# Patient Record
Sex: MALE | Race: WHITE | NOT HISPANIC OR LATINO | Employment: OTHER | URBAN - NONMETROPOLITAN AREA
[De-identification: names, ages, dates, MRNs, and addresses within clinical notes are randomized per-mention and may not be internally consistent; named-entity substitution may affect disease eponyms.]

---

## 2019-01-22 ENCOUNTER — OFFICE VISIT CONVERTED (OUTPATIENT)
Dept: FAMILY MEDICINE CLINIC | Age: 59
End: 2019-01-22
Attending: FAMILY MEDICINE

## 2019-02-26 ENCOUNTER — OFFICE VISIT CONVERTED (OUTPATIENT)
Dept: FAMILY MEDICINE CLINIC | Age: 59
End: 2019-02-26
Attending: FAMILY MEDICINE

## 2019-05-28 ENCOUNTER — OFFICE VISIT CONVERTED (OUTPATIENT)
Dept: FAMILY MEDICINE CLINIC | Age: 59
End: 2019-05-28
Attending: FAMILY MEDICINE

## 2019-08-26 ENCOUNTER — OFFICE VISIT CONVERTED (OUTPATIENT)
Dept: FAMILY MEDICINE CLINIC | Age: 59
End: 2019-08-26
Attending: FAMILY MEDICINE

## 2020-03-09 ENCOUNTER — OFFICE VISIT CONVERTED (OUTPATIENT)
Dept: FAMILY MEDICINE CLINIC | Age: 60
End: 2020-03-09
Attending: FAMILY MEDICINE

## 2020-11-24 ENCOUNTER — HOSPITAL ENCOUNTER (OUTPATIENT)
Dept: OTHER | Facility: HOSPITAL | Age: 60
Discharge: HOME | End: 2020-11-24
Attending: FAMILY MEDICINE

## 2020-11-24 ENCOUNTER — OFFICE VISIT CONVERTED (OUTPATIENT)
Dept: FAMILY MEDICINE CLINIC | Age: 60
End: 2020-11-24
Attending: FAMILY MEDICINE

## 2020-11-24 LAB
ERYTHROCYTE [DISTWIDTH] IN BLOOD BY AUTOMATED COUNT: 13 % (ref 11.5–14.5)
HBA1C MFR BLD: 15.5 G/DL (ref 14–18)
HCT VFR BLD AUTO: 46.7 % (ref 42–52)
MCH RBC QN AUTO: 27.7 PG (ref 27–31)
MCHC RBC AUTO-ENTMCNC: 33.2 G/DL (ref 33–37)
MCV RBC AUTO: 83.5 FL (ref 80–96)
PLATELET # BLD AUTO: 211 10*3/UL (ref 130–400)
PMV BLD AUTO: 11.4 FL (ref 7.4–10.4)
RBC # BLD AUTO: 5.59 10*6/UL (ref 4.7–6.1)
WBC # BLD AUTO: 7.83 10*3/UL (ref 4.8–10.8)

## 2020-11-25 LAB
ALBUMIN SERPL-MCNC: 4.4 G/DL (ref 3.5–5)
ALBUMIN/GLOB SERPL: 1.8 {RATIO} (ref 1.4–2.6)
ALP SERPL-CCNC: 82 U/L (ref 56–119)
ALT SERPL-CCNC: 32 U/L (ref 10–40)
ANION GAP SERPL CALC-SCNC: 18 MMOL/L (ref 8–19)
AST SERPL-CCNC: 19 U/L (ref 15–50)
BILIRUB SERPL-MCNC: 0.27 MG/DL (ref 0.2–1.3)
BUN SERPL-MCNC: 18 MG/DL (ref 5–25)
BUN/CREAT SERPL: 16 {RATIO} (ref 6–20)
CALCIUM SERPL-MCNC: 9.5 MG/DL (ref 8.7–10.4)
CHLORIDE SERPL-SCNC: 103 MMOL/L (ref 99–111)
CHOLEST SERPL-MCNC: 164 MG/DL (ref 107–200)
CHOLEST/HDLC SERPL: 3.6 {RATIO} (ref 3–6)
CONV CO2: 23 MMOL/L (ref 22–32)
CONV TOTAL PROTEIN: 6.9 G/DL (ref 6.3–8.2)
CREAT UR-MCNC: 1.16 MG/DL (ref 0.7–1.2)
GFR SERPLBLD BASED ON 1.73 SQ M-ARVRAT: >60 ML/MIN/{1.73_M2}
GLOBULIN UR ELPH-MCNC: 2.5 G/DL (ref 2–3.5)
GLUCOSE SERPL-MCNC: 115 MG/DL (ref 70–99)
HCV AB S/CO SERPL IA: <0.1 S/CO RATIO (ref 0–0.9)
HDLC SERPL-MCNC: 46 MG/DL (ref 40–60)
LDLC SERPL CALC-MCNC: 95 MG/DL (ref 70–100)
OSMOLALITY SERPL CALC.SUM OF ELEC: 291 MOSM/KG (ref 273–304)
POTASSIUM SERPL-SCNC: 4.7 MMOL/L (ref 3.5–5.3)
PSA SERPL-MCNC: 1.18 NG/ML (ref 0–4)
SODIUM SERPL-SCNC: 139 MMOL/L (ref 135–147)
TRIGL SERPL-MCNC: 117 MG/DL (ref 40–150)
TSH SERPL-ACNC: 1.74 M[IU]/L (ref 0.27–4.2)
VLDLC SERPL-MCNC: 23 MG/DL (ref 5–37)

## 2021-05-18 NOTE — PROGRESS NOTES
Walter Chavarria 1960     Office/Outpatient Visit    Visit Date:  08:54 am    Provider: Bennett Lane MD (Assistant: Teetee Youngblood LPN)    Location: [facility]        Electronically signed by Bennett Lane MD on  2019 01:04:07 PM                             SUBJECTIVE:        CC:     Mr. Chavarria is a 58 year old White male.  This is a follow-up visit.          HPI:         Complaint of memory loss (chronic)..  The symptom began more than 5 years ago.  The severity is of moderate intensity.  SINCE MENINGITIS IN , WILL SEE NEUROLOGIST SOON.          Concerning hTN, his current cardiac medication regimen includes a calcium channel blocker.  Compliance with treatment has been good.      ROS:     CONSTITUTIONAL:  Negative for chills and fever.      E/N/T:  Negative for ear pain, nasal congestion and sore throat.      CARDIOVASCULAR:  Negative for chest pain and pedal edema.      RESPIRATORY:  Negative for recent cough and dyspnea.      GASTROINTESTINAL:  Negative for abdominal pain, anorexia, dysphagia, constipation, diarrhea, nausea and vomiting.      NEUROLOGICAL:  Positive for weakness ( left upper extremity; IMPROVING WITH P.T. ).   Negative for headaches.      PSYCHIATRIC:  Positive for PER SISTER, POSSIBLY HALLUCINATING.          PMH/FM/SH:     Last Reviewed on 2019 12:54 PM by Bennett Lane    Past Medical History:             PAST MEDICAL HISTORY         Hospitalizations: MENINGITIS AND PANCREATITIS         Surgical History:         Tonsillectomy/Adenoidectomy         Family History:     Father:  at age 70; Cause of death was stroke     Mother: Coronary Artery Disease ( bypass surgery ); Hypertension; Hyperlipidemia;  has colostomy due to rupture colon , and then reversal;  Depression ( takes zoloft )     Sister(s): Healthy; 2 sister(s) total     Son(s): 1 son(s) total     Daughter(s): 1 daughter(s) total     Maternal Grandfather: Myocardial  Infarction     Maternal Grandmother: uterine         Social History:     Occupation:    Disabled         Tobacco/Alcohol/Supplements:     Last Reviewed on 2/26/2019 12:52 PM by Bennett Lane    Tobacco: He has a past history of cigarette smoking; quit date:  11/2018.  Non-drinker     Caffeine:  He admits to consuming caffeine via soda ( 10 servings per day ).          Substance Abuse History:     Last Reviewed on 2/26/2019 12:52 PM by Bennett Lane    NEGATIVE         Mental Health History:     NEGATIVE             Current Problems:     Last Reviewed on 2/26/2019 12:55 PM by Bennett Lane    High cholesterol     HTN     Arm weakness (left, s/p CVA)     Old CVA     Deaf, minimal speech     Memory loss (chronic)         Immunizations:     Fluzone (3 + years dose) 11/28/2018     PNEUMOVAX 23 (Pneumococcal PPV23) 11/27/2018         Allergies:     Last Reviewed on 2/26/2019 12:52 PM by Bennett Lane      No Known Drug Allergies.         Current Medications:     Last Reviewed on 2/26/2019 12:54 PM by Bennett Lane    Aspirin (ASA) 81mg Chewable Tablet 1 a day     Amlodipine/Benazepril 5mg/10mg Capsules Take 1 capsule(s) by mouth BID     Atorvastatin Calcium 20mg Tablet 1 tab daily     Centrum Silver Men 50+ Tablet Take 1 tablet(s) by mouth daily with food.     Fish Oil 1,000mg Capsules 1 capsules daily         OBJECTIVE:        Vitals:         Current: 2/26/2019 9:01:37 AM    Ht:  5 ft, 6.25 in;  Wt: 162.8 lbs;  BMI: 26.1    T: 98.2 F (oral);  BP: 113/70 mm Hg (right arm, sitting);  P: 80 bpm (right arm (BP Cuff), sitting);  sCr: 0.96 mg/dL;  GFR: 76.57        Exams:     PHYSICAL EXAM:     GENERAL: Vitals recorded well developed, well nourished;  well groomed;  no apparent distress;     NECK: thyroid is non-palpable;     RESPIRATORY: normal respiratory rate and pattern with no distress; normal breath sounds with no rales, rhonchi, wheezes or rubs;     CARDIOVASCULAR:  normal rate; rhythm is regular;  no systolic murmur; no edema;     GASTROINTESTINAL: nontender;     LYMPHATIC: no enlargement of cervical or facial nodes;     MUSCULOSKELETAL: LEFT  3/5;     NEUROLOGIC: GROSSLY INTACT         ASSESSMENT           780.93   R41.3  Memory loss (chronic)              DDx:     401.1   I10  HTN              DDx:         ORDERS:         Lab Orders:       APPTO  Appointment need  (In-House)                   PLAN:          Memory loss (chronic)         RECOMMENDATIONS given include: SEE NEUROLOGY AS PLANNED and CONSIDER NEURO/PSYCH EVAL.      FOLLOW-UP: Schedule a follow-up visit in 3 months..   for Medicare Wellness Visit           Orders:       APPTO  Appointment need  (In-House)            HTN         MEDICATIONS: (no change to current medication regimen)             Patient Recommendations:        For  Memory loss (chronic):     Schedule a follow-up visit in 3 months.                APPOINTMENT INFORMATION:        Monday Tuesday Wednesday Thursday Friday Saturday Sunday            Time:___________________AM  PM   Date:_____________________             CHARGE CAPTURE           **Please note: ICD descriptions below are intended for billing purposes only and may not represent clinical diagnoses**        Primary Diagnosis:         780.93 Memory loss (chronic)            R41.3    Other amnesia              Orders:          60232   Office/outpatient visit; established patient, level 4  (In-House)             APPTO   Appointment need  (In-House)           401.1 HTN            I10    Essential (primary) hypertension

## 2021-05-18 NOTE — PROGRESS NOTES
Walter Chavarria  1960     Office/Outpatient Visit    Visit Date: Tue, Nov 24, 2020 10:57 am    Provider: Bennett Lane MD (Assistant: Sunita Olea MA)    Location: [facility]        Electronically signed by Bennett Lane MD on  11/24/2020 05:46:20 PM                             Subjective:        CC: Mr. Chavarria is a 59 year old White male.  This is a follow-up visit.  The patient is accompanied into the exam room by his family member and Stacie.  [facility]         HPI:           Mr. Chavarria is here for a Medicare wellness visit.  The required HRA questions are integrated within this visit note. Family medical history and individual medical/surgical history were reviewed and updated.  A current height, weight, BMI, blood pressure, and pulse were recorded in the vitals section of the note and have been reviewed. Patient's medications, including supplements, were recorded in the chart and reviewed.  Current providers and suppliers were reviewed and updated.          Self-Assessment of Health: He rates his health as very good. He rates his confidence of being able to control/manage most of his health problems as not very confident. His physical/emotional health has limited his social activites not at all.  A review of possible cognitive impairment was performed and the following was noted: he is having difficulty driving;  memory changes are noted; MoCA score was UNABLE TO ASSESS DUE TO BEING NONVERBAL.  A review of functional ability, including bathing, dressing, walking, and urine/bowel continence as well as level of safety was performed and was found to be negative.  DOES HAVE TO BE TOLD WHEN TO TAKE A SHOWER, SOMETIMES MULTIPLE TIMES, DOES NOT REMEMBER TO WASH HAIR OR BODY WHILE IN SHOWER PER FAMILY MEMBER - SISTER - DEBBIEFalls Risk: Has not had any falls or only one fall without injury in the past year.  In regard to hearing, he reports having trouble hearing the TV/radio when  others do not and having to strain to hear or understand conversations, but not wearing hearing aid(s).  Concerning home safety, he reports that at home he DOES have adequate lighting, a skid resistant shower/tub, handrails on stairs, functioning smoke alarms and absence of throw rugs, but not grab bars in the bath.          Immunization Status: ** >10 years since last Td booster; ** Has not received influenza vaccine for this season; ** Has not received Prevnar 13 vaccination; Age>60, no shingles vaccination; Physical Activity: He never excercises.; Type of diet patient normally eats is described as meat and potatoes Tobacco: Former smoker Preventative Health updated today.            PHQ-9 Depression Screening: Completed form scanned and in chart; Total Score UNABLE TO ASSESS DUE TO BEING NONVERBAL     ROS:     CONSTITUTIONAL:  Negative for chills and fever.      EYES:  Negative for eye drainage.      E/N/T:  Negative for ear pain, nasal congestion and sore throat.      CARDIOVASCULAR:  Negative for chest pain and pedal edema.      RESPIRATORY:  Negative for recent cough and dyspnea.      GASTROINTESTINAL:  Negative for abdominal pain, anorexia, dysphagia, constipation, diarrhea, nausea and vomiting.      GENITOURINARY:  Negative for urinary incontinence.      NEUROLOGICAL:  Positive for weakness ( left upper extremity; IMPROVING WITH P.T. ).   Negative for fainting, headaches or seizures.      PSYCHIATRIC:  Positive for insomnia (SLEEPS DURING THE DAY, AWAKE AT NIGHT).          Past Medical History / Family History / Social History:         Last Reviewed on 11/24/2020 11:13 AM by Bennett Lane    Past Medical History:             PAST MEDICAL HISTORY         Hospitalizations: MENINGITIS AND PANCREATITIS         PREVENTIVE HEALTH MAINTENANCE             DENTAL CLEANING: RECOMMENDED 5/28/19     EYE EXAM: RECOMMENDED 5/28/19         Surgical History:         Tonsillectomy/Adenoidectomy         Family  History:     Father:  at age 70; Cause of death was stroke     Mother: Coronary Artery Disease ( bypass surgery ); Hypertension; Hyperlipidemia;  has colostomy due to rupture colon , and then reversal;  Depression ( takes zoloft )     Sister(s): Healthy; 2 sister(s) total     Son(s): 1 son(s) total     Daughter(s): 1 daughter(s) total     Maternal Grandfather: Myocardial Infarction     Maternal Grandmother: uterine         Social History:     Occupation:    Disabled         Tobacco/Alcohol/Supplements:     Last Reviewed on 2020 11:13 AM by Bennett Lane    Tobacco: He has a past history of cigarette smoking; quit date:  2018.  Non-drinker     Caffeine:  He admits to consuming caffeine via -LITTLE.          Substance Abuse History:     Last Reviewed on 2020 11:13 AM by Bennett Lane    NEGATIVE         Mental Health History:     NEGATIVE         Current Problems:     Last Reviewed on 2020 11:13 AM by Bennett Lane    Deaf, minimal speech    Essential (primary) hypertension    Unspecified sequelae of cerebral infarction    High cholesterol    Dementia    Encounter for general adult medical examination without abnormal findings    Encounter for screening for depression    Encounter for immunization        Immunizations:     Fluzone (3 + years dose) 2018    PNEUMOVAX 23 (Pneumococcal PPV23) 2018        Allergies:     Last Reviewed on 2020 11:13 AM by Bennett Lane    No Known Allergies.        Current Medications:     Last Reviewed on 2020 11:13 AM by Bennett Lane    atorvastatin 20 mg oral tablet [1 tab daily]    aspirin 81 mg oral tablet,chewable [1 a day]    Fish Oil 300-1,000 mg oral capsule [1 capsules daily ]    citalopram 20 mg oral tablet [take 1  PO QHS]    amLODIPine 5 mg oral tablet [TAKE ONE TABLET BY MOUTH TWICE A DAY]    benazepriL 10 mg oral tablet [TAKE ONE TABLET BY MOUTH TWICE A DAY]    QUEtiapine 50 mg  oral tablet [TAKE ONE TABLET BY MOUTH EVERY NIGHT AT BEDTIME]        Objective:        Vitals:         Current: 11/24/2020 11:01:46 AM    Ht:  5 ft, 6.25 in;  Wt: 154 lbs;  BMI: 24.7T: 97.4 F (temporal);  BP: 151/81 mm Hg (right arm, sitting);  P: 79 bpm (right arm (BP Cuff), sitting);  sCr: 0.96 mg/dL;  GFR: 73.92        Exams:     PHYSICAL EXAM:     GENERAL: Vitals recorded well developed, well nourished;  well groomed;  no apparent distress;     EYES: conjunctiva and cornea are normal;     E/N/T:  normal EACs, TMs, nasal/oral mucosa, teeth, gingiva, and oropharynx;     NECK: thyroid is non-palpable;     RESPIRATORY: normal respiratory rate and pattern with no distress; normal breath sounds with no rales, rhonchi, wheezes or rubs;     CARDIOVASCULAR: normal rate; rhythm is regular;  no systolic murmur; no edema;     GASTROINTESTINAL: nontender;     LYMPHATIC: no enlargement of cervical or facial nodes; no supraclavicular nodes;     MUSCULOSKELETAL: LEFT  3/5;     NEUROLOGIC: GROSSLY INTACT     PSYCHIATRIC: appropriate affect and demeanor;         Procedures:     Encounter for immunization    Regarding contraindications to an Influenza vaccine: Denies moderate/severe illness with/without fever; serious reaction to eggs, egg proteins, gentamicin, gelatin, arginine, neomycin or polymixin; serious reaction after recieving previous influenza vaccines; and history of Guillain-Ellsworth Syndrome.              Assessment:         Z00.00   Encounter for general adult medical examination without abnormal findings       I10   Essential (primary) hypertension       272.0   High cholesterol       Z12.5   Encounter for screening for malignant neoplasm of prostate       Z11.59   Encounter for screening for other viral diseases       Z23   Encounter for immunization       Z13.31   Encounter for screening for depression           ORDERS:         Meds Prescribed:       [Refilled] QUEtiapine 50 mg oral tablet [TAKE ONE TABLET BY  MOUTH EVERY NIGHT AT BEDTIME], #90 (ninety) tablets, Refills: 1 (one)       [Refilled] benazepriL 10 mg oral tablet [TAKE ONE TABLET BY MOUTH TWICE A DAY], #180 (one hundred and eighty) tablets, Refills: 1 (one)       [Refilled] amLODIPine 5 mg oral tablet [TAKE ONE TABLET BY MOUTH TWICE A DAY], #180 (one hundred and eighty) tablets, Refills: 1 (one)       [Refilled] atorvastatin 20 mg oral tablet [1 tab daily], #90 (ninety) tablets, Refills: 1 (one)       [Refilled] citalopram 20 mg oral tablet [take 1  PO QHS], #90 (ninety) tablets, Refills: 1 (one)       [Refilled] aspirin 81 mg oral tablet,chewable [1 a day], #90 (ninety) tablets, Refills: 1 (one)         Lab Orders:       55374  BDCB2 - [facility] CBC w/o diff  (Send-Out)            86250  COMP - [facility] Comp. Metabolic Panel  (Send-Out)            73027  TSH - [facility] TSH  (Send-Out)            74042  [facility] Lipid Panel  (Send-Out)            *  [facility] Medicare screening PSA  (Send-Out)              [facility] Hepatitis C AB (Medicare Screen)  (Send-Out)              Procedures Ordered:         Annual wellness visit, includes a PPPS, subsequent visit  (In-House)              Other Orders:       02341  Influenza virus vaccine, quadrivalent, split virus, preservative free 3 years of age & older  (In-House)            1101F  Pt screen for fall risk; document no falls in past year or only 1 fall w/o injury in past year (HONG)  (In-House)              Administration of influenza virus vaccine  (x1)                  Plan:         Encounter for general adult medical examination without abnormal findings    MIPS Has had no falls or only one fall without injury in the past year Vaccines Flu and Pneumonia updated in Shot record ADVANCED DIRECTIVES: None         COUNSELING was provided today regarding the following topics: healthy eating habits, tobacco, use of seat belts, fall prevention, Medicare Preventive Service Guide given to patient., Advanced Directives  information given, ADVISED TO SEE AN EYE DOCTOR AND A DENTIST REGULARLY, and Given Home Safety Handout.      FOLLOW-UP: Schedule a follow-up visit in 6 months.            Orders:       1101F  Pt screen for fall risk; document no falls in past year or only 1 fall w/o injury in past year (HONG)  (In-House)              Annual wellness visit, includes a PPPS, subsequent visit  (In-House)              Essential (primary) hypertension    LABORATORY:  Labs ordered to be performed today include CBC W/O DIFF, Comprehensive metabolic panel, and TSH.            Prescriptions:       [Refilled] benazepriL 10 mg oral tablet [TAKE ONE TABLET BY MOUTH TWICE A DAY], #180 (one hundred and eighty) tablets, Refills: 1 (one)       [Refilled] amLODIPine 5 mg oral tablet [TAKE ONE TABLET BY MOUTH TWICE A DAY], #180 (one hundred and eighty) tablets, Refills: 1 (one)       [Refilled] aspirin 81 mg oral tablet,chewable [1 a day], #90 (ninety) tablets, Refills: 1 (one)           Orders:       [id number]  [address] CBC w/o diff  (Send-Out)            80033  COMP [facility] Comp. Metabolic Panel  (Send-Out)            22589  TSH [facility] TSH  (Send-Out)              High cholesterol    LABORATORY:  Labs ordered to be performed today include lipid panel.            Prescriptions:       [Refilled] atorvastatin 20 mg oral tablet [1 tab daily], #90 (ninety) tablets, Refills: 1 (one)           Orders:       43891  [facility] Lipid Panel  (Send-Out)              Encounter for screening for malignant neoplasm of prostate    LABORATORY:  Labs ordered to be performed today include PSA Screening Medicare patients.            Orders:       *  [facility] Medicare screening PSA  (Send-Out)              Encounter for screening for other viral diseases    LABORATORY:  Labs ordered to be performed today include Hepatitis C Ab (Medicare Screen).            Orders:         [facility] Hepatitis C AB (Medicare Screen)  (Send-Out)              Encounter for  immunization          Immunizations:       97934  Influenza virus vaccine, quadrivalent, split virus, preservative free 3 years of age & older  (In-House)                Dose (ml): 0.5  Site: right deltoid  Route: intramuscular  Administered by: Sunita Olea          : [employer]  Lot #: [id number]  Exp: 06/30/2021          NDC: [address]-17        Administration of influenza virus vaccine  (x1)              Other Prescriptions:       [Refilled] QUEtiapine 50 mg oral tablet [TAKE ONE TABLET BY MOUTH EVERY NIGHT AT BEDTIME], #90 (ninety) tablets, Refills: 1 (one)       [Refilled] citalopram 20 mg oral tablet [take 1  PO QHS], #90 (ninety) tablets, Refills: 1 (one)         Patient Recommendations:        For  Encounter for general adult medical examination without abnormal findings:    Limit dietary intake of fat (especially saturated fat) and cholesterol.  Eat a variety of foods, including plenty of fruits, vegetables, and grain containg fiber, limit fat intake to 30% of total calories. Balance caloric intake with energy expended. Avoid using tobacco products.  If you already use tobacco, it is recommended that you stop.  A variety of methods have been shown to be effective in smoking cessation. Always use shoulder/lap restraints when driving or riding in a vehicle, even those equipped with air bags. Regularly exercise within recommended guidelines, especially to maintain balance. Remove obstacles in walkways at home.  Use non-skid material for bathtub safety.  Schedule a follow-up visit in 6 months.              Charge Capture:         Primary Diagnosis:     Z00.00  Encounter for general adult medical examination without abnormal findings           Orders:      19788  Preventive medicine, established patient, age 40-64 years  (In-House)            1101F  Pt screen for fall risk; document no falls in past year or only 1 fall w/o injury in past year (HONG)  (In-House)              Annual  wellness visit, includes a PPPS, subsequent visit  (In-House)              I10  Essential (primary) hypertension     272.0  High cholesterol     Z12.5  Encounter for screening for malignant neoplasm of prostate     Z11.59  Encounter for screening for other viral diseases     Z23  Encounter for immunization           Orders:      61049  Influenza virus vaccine, quadrivalent, split virus, preservative free 3 years of age & older  (In-House)              Administration of influenza virus vaccine  (x1)          Z13.31  Encounter for screening for depression

## 2021-05-18 NOTE — PROGRESS NOTES
Walter Chavarria 1960     Office/Outpatient Visit    Visit Date: Tue, May 28, 2019 08:39 am    Provider: Bennett Lane MD (Assistant: Sunita Olea MA)    Location: [facility]        Electronically signed by Bennett Lane MD on  05/28/2019 01:00:35 PM                             SUBJECTIVE:        CC:     Mr. Chavarria is a 58 year old White male.  This is a follow-up visit.  The patient is accompanied into the exam room by his family member.  MCW         HPI:         Mr. Chavarria is here for a Medicare wellness visit.  The required HRA questions are integrated within this visit note. Family medical history and individual medical/surgical history were reviewed and updated.  A current height, weight, BMI, blood pressure, and pulse were recorded in the vitals section of the note and have been reviewed. Patient's medications, including supplements, were recorded in the chart and reviewed.  Current providers and suppliers were reviewed and updated.  A review of possible cognitive impairment was performed and the following was noted: Patient has intellectual disability.  A review of functional ability, including bathing, dressing, walking, and urine/bowel continence as well as level of safety was performed and was found to be negative.  Falls Risk: Has not had any falls or only one fall without injury in the past year.  In regard to hearing, he reports having trouble hearing the TV/radio when others do not and having to strain to hear or understand conversations, but not wearing hearing aid(s).  Concerning home safety, he reports that at home he DOES have adequate lighting, a skid resistant shower/tub, handrails on stairs, functioning smoke alarms and absence of throw rugs, but not grab bars in the bath.  Physical Activity: He never excercises.; Type of diet patient normally eats is described as well-balanced with fruits and vegetables Tobacco: He has a past history of cigarette smoking; quit date:   2019.    Preventative Health updated today.          PHQ-9 Depression Screening: Completed form scanned and in chart; Total Score 18 Alcohol Consumption Screening: Completed form scanned and in chart; Total Score 0     ROS:     CONSTITUTIONAL:  Negative for chills and fever.      EYES:  Negative for eye drainage.      E/N/T:  Negative for ear pain, nasal congestion and sore throat.      CARDIOVASCULAR:  Negative for chest pain and pedal edema.      RESPIRATORY:  Negative for recent cough and dyspnea.      GASTROINTESTINAL:  Negative for abdominal pain, anorexia, dysphagia, constipation, diarrhea, nausea and vomiting.      GENITOURINARY:  Negative for urinary incontinence.      NEUROLOGICAL:  Positive for weakness ( left upper extremity; IMPROVING WITH P.T. ).   Negative for fainting, headaches or seizures.      PSYCHIATRIC:  Positive for FREQUENT AGITATION, WILL BE SEEING A NEUROPSYCHIATRIST SOON..          PMH/FMH/SH:     Last Reviewed on 2019 09:10 AM by Bennett Lane    Past Medical History:             PAST MEDICAL HISTORY         Hospitalizations: MENINGITIS AND PANCREATITIS         Surgical History:         Tonsillectomy/Adenoidectomy         Family History:     Father:  at age 70; Cause of death was stroke     Mother: Coronary Artery Disease ( bypass surgery ); Hypertension; Hyperlipidemia;  has colostomy due to rupture colon , and then reversal;  Depression ( takes zoloft )     Sister(s): Healthy; 2 sister(s) total     Son(s): 1 son(s) total     Daughter(s): 1 daughter(s) total     Maternal Grandfather: Myocardial Infarction     Maternal Grandmother: uterine         Social History:     Occupation:    Disabled         Tobacco/Alcohol/Supplements:     Last Reviewed on 2019 09:09 AM by Bennett Lane    Tobacco: He has a past history of cigarette smoking; quit date:  2018.  Non-drinker     Caffeine:  He admits to consuming caffeine via soda ( 3 servings per day ).           Substance Abuse History:     Last Reviewed on 5/28/2019 09:09 AM by Bennett Lane    NEGATIVE         Mental Health History:     NEGATIVE             Current Problems:     Last Reviewed on 5/28/2019 09:10 AM by Bennett Lane    High cholesterol     HTN     Arm weakness (left, s/p CVA)     Old CVA     Deaf, minimal speech     Memory loss (chronic)     Screening for depression         Immunizations:     Fluzone (3 + years dose) 11/28/2018     PNEUMOVAX 23 (Pneumococcal PPV23) 11/27/2018         Allergies:     Last Reviewed on 5/28/2019 09:10 AM by Bennett Lane      No Known Drug Allergies.         Current Medications:     Last Reviewed on 5/28/2019 09:10 AM by Bennett Lane    Amlodipine/Benazepril 5mg/10mg Capsules Take 1 capsule(s) by mouth BID     Aspirin (ASA) 81mg Chewable Tablet 1 a day     Atorvastatin Calcium 20mg Tablet 1 tab daily     Centrum Silver Men 50+ Tablet Take 1 tablet(s) by mouth daily with food.     Fish Oil 1,000mg Capsules 1 capsules daily         OBJECTIVE:        Vitals:         Current: 5/28/2019 8:47:36 AM    Ht:  5 ft, 6.25 in;  Wt: 155.8 lbs;  BMI: 25.0    T: 97.5 F (oral);  BP: 114/70 mm Hg (right arm, sitting);  P: 79 bpm (right arm (BP Cuff), sitting);  sCr: 0.96 mg/dL;  GFR: 75.15        Exams:     PHYSICAL EXAM:     GENERAL: Vitals recorded well developed, well nourished;  well groomed;  no apparent distress;     EYES: conjunctiva and cornea are normal;     E/N/T:  normal EACs, TMs, nasal/oral mucosa, teeth, gingiva, and oropharynx;     NECK: thyroid is non-palpable;     RESPIRATORY: normal respiratory rate and pattern with no distress; normal breath sounds with no rales, rhonchi, wheezes or rubs;     CARDIOVASCULAR: normal rate; rhythm is regular;  no systolic murmur; no edema;     GASTROINTESTINAL: nontender;     LYMPHATIC: no enlargement of cervical or facial nodes; no supraclavicular nodes;     MUSCULOSKELETAL: LEFT  3/5;      NEUROLOGIC: GROSSLY INTACT     PSYCHIATRIC: appropriate affect and demeanor;         ASSESSMENT           V70.0   Z00.00  Health checkup              DDx:     290.0   R29.898  Dementia              DDx:     V79.0   Z13.89  Screening for depression              DDx:         ORDERS:         Meds Prescribed:       Citalopram Hydrobromide 20mg Tablet take 1  PO QHS  #30 (Thirty) tablet(s) Refills: 1         Procedures Ordered:         Annual wellness visit, includes a PPPS, subsequent visit  (In-House)           Other Orders:         Depression screen positive and follow up plan documented  (In-House)         1101F  Pt screen for fall risk; document no falls in past year or only 1 fall w/o injury in past year (HONG)  (In-House)           Negative EtOH screen  (In-House)                   PLAN:          Health checkup         COUNSELING was provided today regarding the following topics: healthy eating habits, tobacco, use of seat belts, fall prevention, Medicare Preventive Service Guide given to patient., Advanced Directives information given, ADVISED TO SEE AN EYE DOCTOR AND A DENTIST REGULARLY, and Given Home Safety Handout.      FOLLOW-UP: Schedule a follow-up visit in 3 months. LABS AT NEXT VISIT MIPS Positive Depression Screen: WILL BE SEEING A PSYCHIATRIST SOON Negative alcohol screen           Orders:         Depression screen positive and follow up plan documented  (In-House)         1101F  Pt screen for fall risk; document no falls in past year or only 1 fall w/o injury in past year (HONG)  (In-House)           Negative EtOH screen  (In-House)           Annual wellness visit, includes a PPPS, subsequent visit  (In-House)            Dementia         RECOMMENDATIONS given include: SEE PSYCHAITRIST AS PLANNED.            Prescriptions:       Citalopram Hydrobromide 20mg Tablet take 1  PO QHS  #30 (Thirty) tablet(s) Refills: 1             Patient Recommendations:        For  Health  checkup:     Limit dietary intake of fat (especially saturated fat) and cholesterol.  Eat a variety of foods, including plenty of fruits, vegetables, and grain containg fiber, limit fat intake to 30% of total calories. Balance caloric intake with energy expended. Avoid using tobacco products.  If you already use tobacco, it is recommended that you stop.  A variety of methods have been shown to be effective in smoking cessation. Always use shoulder/lap restraints when driving or riding in a vehicle, even those equipped with air bags. Regularly exercise within recommended guidelines, especially to maintain balance. Remove obstacles in walkways at home.  Use non-skid material for bathtub safety.  Schedule a follow-up visit in 3 months.              CHARGE CAPTURE           **Please note: ICD descriptions below are intended for billing purposes only and may not represent clinical diagnoses**        Primary Diagnosis:         V70.0 Health checkup            Z00.00    Encounter for general adult medical examination without abnormal findings              Orders:             Depression screen positive and follow up plan documented  (In-House)             1101F   Pt screen for fall risk; document no falls in past year or only 1 fall w/o injury in past year (HONG)  (In-House)                Negative EtOH screen  (In-House)                Annual wellness visit, includes a PPPS, subsequent visit  (In-House)           290.0 Dementia            R29.898    Other symptoms and signs involving the musculoskeletal system    V79.0 Screening for depression            Z13.89    Encounter for screening for other disorder

## 2021-05-18 NOTE — PROGRESS NOTES
Walter Chavarria 1960     Office/Outpatient Visit    Visit Date: Tue, Jan 22, 2019 10:06 am    Provider: Bennett Lane MD (Assistant: Pattie Gardner MA)    Location: [facility]        Electronically signed by Bennett Lane MD on  01/22/2019 12:43:51 PM                             SUBJECTIVE:        CC:     Mr. Chavarria is a 58 year old White male.  He is here today following a transition of care from an inpatient hospital: [facility] The patient was admitted on 11/27/18 to 11/30/18 when transferred to [facility]. The patient was admitted for Acute ischemic stroke. Our office called the patient within 48 hours of discharge and scheduled the follow-up appointment. During the patient's hospital stay the patient was treated by Dr. Morrison and from a skilled nursing facility [facility] 11/30/18 to 1/10/19 and The patient was admitted for stroke rehab.  (PT HAD PNEUMONIA VACCINE, BUT UNSURE WHICH ONE) The patient is accompanied into the exam room by his SISTER.          HPI:         Mr. Chavarria presents in follow up from hospital admission He was diagnosed with ACUTE CVA.  The following radiology tests were done: head CT.  The patient's course has improved.  It is of severe intensity.  WENT TO REHAB, NOW HOME WITH HIS SISTER.  STILL SOME LEFT ARM WEAKNESS AND POSSIBLE SWALLOWING ISSUE.  HOME PT/OT/ST ARE INVOLVED     ROS:     CONSTITUTIONAL:  Negative for chills and fever.      E/N/T:  Negative for ear pain, nasal congestion and sore throat.      CARDIOVASCULAR:  Negative for chest pain and pedal edema.      RESPIRATORY:  Negative for recent cough and dyspnea.      GASTROINTESTINAL:  Negative for abdominal pain, anorexia, dysphagia, constipation, diarrhea, nausea and vomiting.      NEUROLOGICAL:  Positive for memory loss (CHRONIC, STABLE).   Negative for headaches.          PMH/FMH/SH:     Last Reviewed on 1/22/2019 10:35 AM by Bennett Lane    Past Medical History:         Hospitalizations:  minengitis at three months,, (Pancreatitis 2013)         Surgical History:         Tonsillectomy/Adenoidectomy         Family History:     Father:  at age 70; Cause of death was stroke     Mother: Coronary Artery Disease ( bypass surgery ); Hypertension; Hyperlipidemia;  has colostomy due to rupture colon , and then reversal;  Depression ( takes zoloft )     Sister(s): Healthy; 2 sister(s) total     Son(s): 1 son(s) total     Daughter(s): 1 daughter(s) total     Maternal Grandfather: Myocardial Infarction     Maternal Grandmother: uterine         Social History:     Occupation:    Disabled         Tobacco/Alcohol/Supplements:     Last Reviewed on 2019 10:35 AM by Bennett Lane    Tobacco: He has a past history of cigarette smoking; quit date:  2018.  Non-drinker     Caffeine:  He admits to consuming caffeine via soda ( 10 servings per day ).          Substance Abuse History:     Last Reviewed on 2019 10:34 AM by Bennett Lane    NEGATIVE         Mental Health History:     NEGATIVE             Current Problems:     Last Reviewed on 2019 10:51 AM by Bennett Lane    Old CVA     Deaf, minimal speech     Memory loss (chronic)     Follow-up examination         Immunizations:     Fluzone (3 + years dose) 2018         Allergies:     Last Reviewed on 2019 10:35 AM by Bennett Lane      No Known Drug Allergies.         Current Medications:     Last Reviewed on 2019 10:38 AM by Bennett Lane    Amlodipine/Benazepril 5mg/10mg Capsules Take 1 capsule(s) by mouth BID     Aspirin (ASA) 81mg Chewable Tablet 1 a day     Atorvastatin Calcium 20mg Tablet 1 tab daily     Florastor 250mg Capsules Take 1 capsule(s) by mouth bid         OBJECTIVE:        Vitals:         Current: 2019 10:16:55 AM    Ht:  5 ft, 6.25 in;  Wt: 158.8 lbs;  BMI: 25.4    T: 97.4 F (oral);  BP: 122/69 mm Hg (right arm, sitting);  P: 92 bpm (right arm (BP Cuff), sitting);   sCr: 0.96 mg/dL;  GFR: 75.76        Exams:     PHYSICAL EXAM:     GENERAL: Vitals recorded well developed, well nourished;  well groomed;  no apparent distress;     NECK: thyroid is non-palpable;     RESPIRATORY: normal respiratory rate and pattern with no distress; normal breath sounds with no rales, rhonchi, wheezes or rubs;     CARDIOVASCULAR: normal rate; rhythm is regular;  no systolic murmur; no edema;     GASTROINTESTINAL: nontender;     LYMPHATIC: no enlargement of cervical or facial nodes;     MUSCULOSKELETAL: LEFT  3/5;     NEUROLOGIC: GROSSLY INTACT         ASSESSMENT           438.9   I69.30  Old CVA              DDx:     729.89   G54.2  Left Arm weakness              DDx:     305.1   F17.210  Tobacco abuse              DDx:         ORDERS:         Meds Prescribed:       Nicotine 21mg Transdermal Patch Apply 1 patch(es) to hairless area on upper arms daily Remove after 24 hours for 6 weeks  #45 (Forty Five) patch(es) Refills: 0                 PLAN:          Old CVA         FOLLOW-UP: Schedule a follow-up appointment in 6 weeks.      HOME HEALTH AS NOTED         Left Arm weakness     AS ABOVE          Tobacco abuse           Prescriptions:       Nicotine 21mg Transdermal Patch Apply 1 patch(es) to hairless area on upper arms daily Remove after 24 hours for 6 weeks  #45 (Forty Five) patch(es) Refills: 0             Patient Recommendations:        For  Old CVA:     Schedule a follow-up visit in 6 weeks.  I also recommend HOME HEALTH AS NOTED.              CHARGE CAPTURE           **Please note: ICD descriptions below are intended for billing purposes only and may not represent clinical diagnoses**        Primary Diagnosis:         438.9 Old CVA            I69.30    Unspecified sequelae of cerebral infarction              Orders:          54738   Transitional care manage service 14 day discharge  (In-House)           729.89 Left Arm weakness            G54.2    Cervical root disorders, not elsewhere  classified    305.1 Tobacco abuse            F17.210    Nicotine dependence, cigarettes, uncomplicated

## 2021-05-18 NOTE — PROGRESS NOTES
Walter Chavarria 1960     Office/Outpatient Visit    Visit Date: Mon, Aug 26, 2019 03:08 pm    Provider: Bennett Lane MD (Assistant: Venessa Puente LPN)    Location: [facility]        Electronically signed by Bennett Lane MD on  2019 05:32:41 PM                             SUBJECTIVE:        CC: Melatonin 10mg, p.o. q night.     Mr. Chavarria is a 58 year old White male.  The patient is accompanied into the exam room by his family member and sister, Stacie Prakash.  3 month follow up/medication refills         HPI:         Complaint of dementia..  The symptom began more than 5 years ago.  The severity is of moderate intensity.  Prior work-up has included a CT scan.  SINCE MENINGITIS IN , NEUROLOGY ORDERED LABS AND WAS TO HAVE SENT HIM TO A PSYCHIATRIST.  THE CELEXA IS HEPING S/W AND HE WAS ALSO GIVEN SEROQUEL WHICH HAS HELPED HIS SLEEPING S/W.      ROS:     CONSTITUTIONAL:  Negative for chills and fever.      EYES:  Negative for eye drainage.      E/N/T:  Negative for ear pain, nasal congestion and sore throat.      CARDIOVASCULAR:  Negative for chest pain and pedal edema.      RESPIRATORY:  Negative for recent cough and dyspnea.      GASTROINTESTINAL:  Negative for abdominal pain, anorexia, dysphagia, constipation, diarrhea, nausea and vomiting.      GENITOURINARY:  Negative for urinary incontinence.      NEUROLOGICAL:  Positive for weakness ( left upper extremity; IMPROVING WITH P.T. ).   Negative for fainting, headaches or seizures.          PM/[facility]/:     Last Reviewed on 2019 03:20 PM by Bennett Lane    Past Medical History:             PAST MEDICAL HISTORY         Hospitalizations: MENINGITIS AND PANCREATITIS         PREVENTIVE HEALTH MAINTENANCE             DENTAL CLEANING: RECOMMENDED 19     EYE EXAM: RECOMMENDED 19         Surgical History:         Tonsillectomy/Adenoidectomy         Family History:     Father:  at age 70; Cause of death was stroke      Mother: Coronary Artery Disease ( bypass surgery ); Hypertension; Hyperlipidemia;  has colostomy due to rupture colon , and then reversal;  Depression ( takes zoloft )     Sister(s): Healthy; 2 sister(s) total     Son(s): 1 son(s) total     Daughter(s): 1 daughter(s) total     Maternal Grandfather: Myocardial Infarction     Maternal Grandmother: uterine         Social History:     Occupation:    Disabled         Tobacco/Alcohol/Supplements:     Last Reviewed on 8/26/2019 03:18 PM by Bennett Lane    Tobacco: He has a past history of cigarette smoking; quit date:  11/2018.  Non-drinker     Caffeine:  He admits to consuming caffeine via soda ( -doesn't drink sodas as much any longer ).          Substance Abuse History:     Last Reviewed on 8/26/2019 03:18 PM by Bennett Lane    NEGATIVE         Mental Health History:     NEGATIVE             Current Problems:     Last Reviewed on 8/26/2019 03:21 PM by Bennett Lane    Dementia     High cholesterol     HTN     Old CVA     Deaf, minimal speech         Immunizations:     Fluzone (3 + years dose) 11/28/2018     PNEUMOVAX 23 (Pneumococcal PPV23) 11/27/2018         Allergies:     Last Reviewed on 8/26/2019 03:18 PM by Bennett Lane      No Known Drug Allergies.         Current Medications:     Last Reviewed on 8/26/2019 03:20 PM by Bennett Lane    Citalopram Hydrobromide 20mg Tablet take 1  PO QHS     Benazepril HCl 10mg Tablet Take 1 tablet(s) by mouth bid     Atorvastatin Calcium 20mg Tablet 1 tab daily     Aspirin (ASA) 81mg Chewable Tablet 1 a day     Centrum Silver Men 50+ Tablet Take 1 tablet(s) by mouth daily with food.     Amlodipine  5mg Tablet 1 tab BID     Fish Oil 1,000mg Capsules 1 capsules daily         OBJECTIVE:        Vitals:         Current: 8/26/2019 3:15:14 PM    Ht:  5 ft, 6.25 in;  Wt: 150.8 lbs;  BMI: 24.2    T: 98.1 F (oral);  BP: 101/69 mm Hg (left arm, sitting);  P: 77 bpm (left arm (BP Cuff),  sitting);  sCr: 0.96 mg/dL;  GFR: 74.12        Exams:     PHYSICAL EXAM:     GENERAL: Vitals recorded well developed, well nourished;  well groomed;  no apparent distress;     NECK: thyroid is non-palpable;     RESPIRATORY: normal respiratory rate and pattern with no distress; normal breath sounds with no rales, rhonchi, wheezes or rubs;     CARDIOVASCULAR: normal rate; rhythm is regular;  no systolic murmur; no edema;     GASTROINTESTINAL: nontender;     LYMPHATIC: no enlargement of cervical or facial nodes;     MUSCULOSKELETAL: LEFT  3/5;     NEUROLOGIC: GROSSLY INTACT         ASSESSMENT           290.0   R29.898  Dementia              DDx:         ORDERS:         Meds Prescribed:       Seroquel (Quetiapine Fumarate) 25mg Tablet 1 qhs  #30 (Thirty) tablet(s) Refills: 2                 PLAN:          Dementia         MEDICATIONS: (no change to current medication regimen)     FOLLOW-UP: Schedule a follow-up visit in 3 months.      WE WILL GET RESULTS FROM TESTING AND FIND OUT ABOUT THE PSYCHIATRY APPT.  NOTE GIVEN TO FAMILY VERIFYING THE DIAGNOSIS OF DEMENTIA           Prescriptions:       Seroquel (Quetiapine Fumarate) 25mg Tablet 1 qhs  #30 (Thirty) tablet(s) Refills: 2             Patient Recommendations:        For  Dementia:     Schedule a follow-up visit in 3 months.              CHARGE CAPTURE           **Please note: ICD descriptions below are intended for billing purposes only and may not represent clinical diagnoses**        Primary Diagnosis:         290.0 Dementia            R29.898    Other symptoms and signs involving the musculoskeletal system              Orders:          [id number]   Office/outpatient visit; established patient, level 3  (In-House)

## 2021-05-18 NOTE — PROGRESS NOTES
Walter Chavarria  1960     Office/Outpatient Visit    Visit Date: Mon, Mar 9, 2020 10:20 am    Provider: Bennett Lane MD (Assistant: Rosy Chu LPN)    Location: [facility]        Electronically signed by Bennett Lane MD on  03/09/2020 02:15:40 PM                             Subjective:        CC: Mr. Chavarria is a 59 year old White male.  This is a follow-up visit.  Meds Refill         HPI:           Mr. Chavarria presents with essential (primary) hypertension.  His current cardiac medication regimen includes a calcium channel blocker.  Compliance with treatment has been good.            Complaint of dementia..  The symptom began more than 5 years ago.  The severity is of moderate intensity.  Prior work-up has included a CT scan.  SINCE MENINGITIS IN 2013, NEG NEUROLOGY EVAL.  DID NOT SEE PSYCHIATRY BUT IS DOING FAIRLY WELL ON CURRENT MEDS BUT STILL NOT SLEEPING VERY WELL.      ROS:     CONSTITUTIONAL:  Negative for chills and fever.      EYES:  Negative for eye drainage.      E/N/T:  Negative for ear pain, nasal congestion and sore throat.      CARDIOVASCULAR:  Negative for chest pain and pedal edema.      RESPIRATORY:  Negative for recent cough and dyspnea.      GASTROINTESTINAL:  Negative for abdominal pain, anorexia, dysphagia, constipation, diarrhea, nausea and vomiting.      GENITOURINARY:  Negative for urinary incontinence.      NEUROLOGICAL:  Positive for weakness ( left upper extremity; IMPROVING WITH P.T. ).   Negative for fainting, headaches or seizures.          Past Medical History / Family History / Social History:         Last Reviewed on 12/03/2019 09:12 AM by Bennett Lnae    Past Medical History:             PAST MEDICAL HISTORY         Hospitalizations: MENINGITIS AND PANCREATITIS         PREVENTIVE HEALTH MAINTENANCE             DENTAL CLEANING: RECOMMENDED 5/28/19     EYE EXAM: RECOMMENDED 5/28/19         Surgical History:          Tonsillectomy/Adenoidectomy         Family History:     Father:  at age 70; Cause of death was stroke     Mother: Coronary Artery Disease ( bypass surgery ); Hypertension; Hyperlipidemia;  has colostomy due to rupture colon , and then reversal;  Depression ( takes zoloft )     Sister(s): Healthy; 2 sister(s) total     Son(s): 1 son(s) total     Daughter(s): 1 daughter(s) total     Maternal Grandfather: Myocardial Infarction     Maternal Grandmother: uterine         Social History:     Occupation:    Disabled         Tobacco/Alcohol/Supplements:     Last Reviewed on 2019 09:12 AM by Bennett Lane    Tobacco: He has a past history of cigarette smoking; quit date:  2018.  Non-drinker     Caffeine:  He admits to consuming caffeine via -LITTLE.          Substance Abuse History:     Last Reviewed on 2019 09:12 AM by Bennett Lane    NEGATIVE         Mental Health History:     NEGATIVE         Current Problems:     Last Reviewed on 2019 09:12 AM by Bennett Lane    Deaf, minimal speech    Essential (primary) hypertension    Unspecified sequelae of cerebral infarction    High cholesterol    Dementia        Immunizations:     Fluzone (3 + years dose) 2018    PNEUMOVAX 23 (Pneumococcal PPV23) 2018        Allergies:     Last Reviewed on 3/09/2020 10:23 AM by Rosy Chu    No Known Allergies.        Current Medications:     Last Reviewed on 3/09/2020 10:25 AM by Rosy Chu    Aspirin (ASA) 81mg Chewable Tablet [1 a day]    atorvastatin 20 mg oral tablet [1 tab daily]    Fish Oil 300-1,000 mg oral capsule [1 capsules daily ]    citalopram 20 mg oral tablet [take 1  PO QHS]    amLODIPine 5 mg oral tablet [TAKE ONE TABLET BY MOUTH TWICE A DAY]    benazepriL 10 mg oral tablet [TAKE ONE TABLET BY MOUTH TWICE A DAY]    Seroquel 25mg Tablet [ 1 qhs]        Objective:        Vitals:         Current: 3/9/2020 10:27:29 AM    Ht:  5 ft, 6.25 in;  Wt: 148 lbs;   BMI: 23.7T: 97.4 F (oral);  BP: 121/87 mm Hg (right arm, sitting);  P: 78 bpm (right arm (BP Cuff), sitting);  sCr: 0.96 mg/dL;  GFR: 72.68        Exams:     PHYSICAL EXAM:     GENERAL: Vitals recorded well developed, well nourished;  well groomed;  no apparent distress;     NECK: thyroid is non-palpable;     RESPIRATORY: normal respiratory rate and pattern with no distress; normal breath sounds with no rales, rhonchi, wheezes or rubs;     CARDIOVASCULAR: normal rate; rhythm is regular;  no systolic murmur; no edema;     GASTROINTESTINAL: nontender;     LYMPHATIC: no enlargement of cervical or facial nodes;     MUSCULOSKELETAL: LEFT  3/5;     NEUROLOGIC: GROSSLY INTACT         Assessment:         I10   Essential (primary) hypertension       290.0   Dementia           ORDERS:         Meds Prescribed:       [Refilled] aspirin 81 mg oral tablet,chewable [1 a day], #30 (thirty) tablets, Refills: 5 (five)       [Refilled] QUEtiapine 50 mg oral tablet [take 1 tablet (50 mg) by oral route q hs], #30 (thirty) tablets, Refills: 5 (five)       [Refilled] citalopram 20 mg oral tablet [take 1  PO QHS], #30 (thirty) tablets, Refills: 5 (five)       [Refilled] benazepriL 10 mg oral tablet [TAKE ONE TABLET BY MOUTH TWICE A DAY], #60 (sixty) tablets, Refills: 5 (five)       [Refilled] amLODIPine 5 mg oral tablet [TAKE ONE TABLET BY MOUTH TWICE A DAY], #60 (sixty) tablets, Refills: 5 (five)       [Refilled] atorvastatin 20 mg oral tablet [1 tab daily], #30 (thirty) tablets, Refills: 5 (five)                 Plan:         Essential (primary) hypertension        MEDICATIONS: (no change to current medication regimen)     FOLLOW-UP: Schedule a follow-up visit in 6 months. [facility] [place]           Prescriptions:       [Refilled] aspirin 81 mg oral tablet,chewable [1 a day], #30 (thirty) tablets, Refills: 5 (five)       [Refilled] benazepriL 10 mg oral tablet [TAKE ONE TABLET BY MOUTH TWICE A DAY], #60 (sixty) tablets, Refills: 5 (five)        [Refilled] amLODIPine 5 mg oral tablet [TAKE ONE TABLET BY MOUTH TWICE A DAY], #60 (sixty) tablets, Refills: 5 (five)         Dementia        WILL LINCREASE THE SEROQUEL           Prescriptions:       [Refilled] QUEtiapine 50 mg oral tablet [take 1 tablet (50 mg) by oral route q hs], #30 (thirty) tablets, Refills: 5 (five)       [Refilled] citalopram 20 mg oral tablet [take 1  PO QHS], #30 (thirty) tablets, Refills: 5 (five)             Other Prescriptions:       [Refilled] atorvastatin 20 mg oral tablet [1 tab daily], #30 (thirty) tablets, Refills: 5 (five)         Patient Recommendations:        For  Essential (primary) hypertension:    Schedule a follow-up visit in 6 months.              Charge Capture:         Primary Diagnosis:     I10  Essential (primary) hypertension           Orders:      86161  Office/outpatient visit; established patient, level 3  (In-House)              290.0  Dementia

## 2021-07-01 VITALS
WEIGHT: 158.8 LBS | HEART RATE: 92 BPM | SYSTOLIC BLOOD PRESSURE: 122 MMHG | DIASTOLIC BLOOD PRESSURE: 69 MMHG | TEMPERATURE: 97.4 F | BODY MASS INDEX: 25.52 KG/M2 | HEIGHT: 66 IN

## 2021-07-01 VITALS
BODY MASS INDEX: 25.04 KG/M2 | TEMPERATURE: 97.5 F | WEIGHT: 155.8 LBS | HEART RATE: 79 BPM | HEIGHT: 66 IN | DIASTOLIC BLOOD PRESSURE: 70 MMHG | SYSTOLIC BLOOD PRESSURE: 114 MMHG

## 2021-07-01 VITALS
SYSTOLIC BLOOD PRESSURE: 113 MMHG | WEIGHT: 162.8 LBS | BODY MASS INDEX: 26.16 KG/M2 | HEIGHT: 66 IN | DIASTOLIC BLOOD PRESSURE: 70 MMHG | TEMPERATURE: 98.2 F | HEART RATE: 80 BPM

## 2021-07-01 VITALS
HEART RATE: 77 BPM | HEIGHT: 66 IN | TEMPERATURE: 98.1 F | WEIGHT: 150.8 LBS | BODY MASS INDEX: 24.23 KG/M2 | DIASTOLIC BLOOD PRESSURE: 69 MMHG | SYSTOLIC BLOOD PRESSURE: 101 MMHG

## 2021-07-02 VITALS
BODY MASS INDEX: 24.75 KG/M2 | SYSTOLIC BLOOD PRESSURE: 151 MMHG | HEART RATE: 79 BPM | HEIGHT: 66 IN | TEMPERATURE: 97.4 F | WEIGHT: 154 LBS | DIASTOLIC BLOOD PRESSURE: 81 MMHG

## 2021-07-02 VITALS
HEIGHT: 66 IN | SYSTOLIC BLOOD PRESSURE: 121 MMHG | HEART RATE: 78 BPM | DIASTOLIC BLOOD PRESSURE: 87 MMHG | WEIGHT: 148 LBS | TEMPERATURE: 97.4 F | BODY MASS INDEX: 23.78 KG/M2

## 2021-08-10 RX ORDER — ATORVASTATIN CALCIUM 20 MG/1
20 TABLET, FILM COATED ORAL DAILY
Qty: 90 TABLET | Refills: 1 | Status: SHIPPED | OUTPATIENT
Start: 2021-08-10 | End: 2021-08-11

## 2021-08-10 RX ORDER — ATORVASTATIN CALCIUM 20 MG/1
20 TABLET, FILM COATED ORAL DAILY
COMMUNITY
End: 2021-08-10 | Stop reason: SDUPTHER

## 2021-08-11 RX ORDER — ATORVASTATIN CALCIUM 20 MG/1
TABLET, FILM COATED ORAL
Qty: 90 TABLET | Refills: 1 | Status: SHIPPED | OUTPATIENT
Start: 2021-08-11 | End: 2021-09-27 | Stop reason: SDUPTHER

## 2021-08-17 RX ORDER — CITALOPRAM 20 MG/1
20 TABLET ORAL NIGHTLY
COMMUNITY
End: 2021-09-27 | Stop reason: SDUPTHER

## 2021-08-17 RX ORDER — CITALOPRAM 20 MG/1
TABLET ORAL
Qty: 90 TABLET | Refills: 3 | Status: SHIPPED | OUTPATIENT
Start: 2021-08-17 | End: 2021-09-27 | Stop reason: SDUPTHER

## 2021-08-17 RX ORDER — QUETIAPINE FUMARATE 50 MG/1
50 TABLET, FILM COATED ORAL NIGHTLY
COMMUNITY
End: 2021-09-27 | Stop reason: SDUPTHER

## 2021-08-17 RX ORDER — QUETIAPINE FUMARATE 50 MG/1
TABLET, FILM COATED ORAL
Qty: 90 TABLET | Refills: 3 | Status: SHIPPED | OUTPATIENT
Start: 2021-08-17 | End: 2021-09-27 | Stop reason: SDUPTHER

## 2021-08-17 RX ORDER — BENAZEPRIL HYDROCHLORIDE 10 MG/1
10 TABLET ORAL 2 TIMES DAILY
COMMUNITY
End: 2021-09-27 | Stop reason: SDUPTHER

## 2021-08-17 RX ORDER — BENAZEPRIL HYDROCHLORIDE 10 MG/1
TABLET ORAL
Qty: 180 TABLET | Refills: 3 | Status: SHIPPED | OUTPATIENT
Start: 2021-08-17 | End: 2021-09-27 | Stop reason: SDUPTHER

## 2021-08-17 NOTE — TELEPHONE ENCOUNTER
Rx Refill Note  Requested Prescriptions     Pending Prescriptions Disp Refills   • benazepril (LOTENSIN) 10 MG tablet [Pharmacy Med Name: BENAZEPRIL HCL 10 MG TABLET] 180 tablet      Sig: TAKE ONE TABLET BY MOUTH TWICE A DAY   • QUEtiapine (SEROquel) 50 MG tablet [Pharmacy Med Name: QUEtiapine FUMARATE 50 MG TAB] 90 tablet      Sig: TAKE ONE TABLET BY MOUTH EVERY NIGHT AT BEDTIME   • citalopram (CeleXA) 20 MG tablet [Pharmacy Med Name: CITALOPRAM HBR 20 MG TABLET] 90 tablet      Sig: TAKE ONE TABLET BY MOUTH EVERY NIGHT AT BEDTIME      Last office visit with prescribing clinician: 11/24/20  Next office visit with prescribing clinician: 9/27/2021      {TIP  Please add Last Relevant Lab Date if appropriate 11/24/2020  {TIP  Is Refill Pharmacy correct? yes  Sunita Olea  08/17/21, 17:14 EDT     Lipid Panel    Lipid Panel 11/24/20   Total Cholesterol 164   Triglycerides 117   HDL Cholesterol 46   VLDL Cholesterol 23   LDL Cholesterol  95      Comments are available for some flowsheets but are not being displayed.           Lab Results   Component Value Date    BUN 18 11/24/2020    CREATININE 1.16 11/24/2020    BCR 16 11/24/2020    K 4.7 11/24/2020    CO2 23 11/24/2020    CALCIUM 9.5 11/24/2020    ALBUMIN 4.4 11/24/2020    LABIL2 1.8 11/24/2020    AST 19 11/24/2020    ALT 32 11/24/2020

## 2021-09-27 ENCOUNTER — OFFICE VISIT (OUTPATIENT)
Dept: FAMILY MEDICINE CLINIC | Age: 61
End: 2021-09-27

## 2021-09-27 VITALS
BODY MASS INDEX: 24.67 KG/M2 | HEART RATE: 78 BPM | TEMPERATURE: 99.3 F | SYSTOLIC BLOOD PRESSURE: 112 MMHG | HEIGHT: 66 IN | DIASTOLIC BLOOD PRESSURE: 68 MMHG

## 2021-09-27 DIAGNOSIS — F02.818 OTHER FRONTOTEMPORAL DEMENTIA WITH BEHAVIORAL DISTURBANCE: ICD-10-CM

## 2021-09-27 DIAGNOSIS — G31.09 OTHER FRONTOTEMPORAL DEMENTIA WITH BEHAVIORAL DISTURBANCE: ICD-10-CM

## 2021-09-27 DIAGNOSIS — I10 ESSENTIAL HYPERTENSION: ICD-10-CM

## 2021-09-27 DIAGNOSIS — E78.00 HIGH CHOLESTEROL: Primary | ICD-10-CM

## 2021-09-27 PROBLEM — H91.3 DEAF MUTISM, ACQUIRED: Status: ACTIVE | Noted: 2021-09-27

## 2021-09-27 PROBLEM — F02.80 OTHER FRONTOTEMPORAL DEMENTIA: Status: ACTIVE | Noted: 2021-09-27

## 2021-09-27 PROCEDURE — 90686 IIV4 VACC NO PRSV 0.5 ML IM: CPT | Performed by: FAMILY MEDICINE

## 2021-09-27 PROCEDURE — 90471 IMMUNIZATION ADMIN: CPT | Performed by: FAMILY MEDICINE

## 2021-09-27 PROCEDURE — 99214 OFFICE O/P EST MOD 30 MIN: CPT | Performed by: FAMILY MEDICINE

## 2021-09-27 RX ORDER — ATORVASTATIN CALCIUM 20 MG/1
20 TABLET, FILM COATED ORAL DAILY
Qty: 90 TABLET | Refills: 3 | Status: SHIPPED | OUTPATIENT
Start: 2021-09-27 | End: 2022-01-01 | Stop reason: SDUPTHER

## 2021-09-27 RX ORDER — AMLODIPINE BESYLATE 5 MG/1
5 TABLET ORAL 2 TIMES DAILY
Qty: 180 TABLET | Refills: 3 | Status: SHIPPED | OUTPATIENT
Start: 2021-09-27 | End: 2022-01-01 | Stop reason: SDUPTHER

## 2021-09-27 RX ORDER — CITALOPRAM 20 MG/1
20 TABLET ORAL
Qty: 90 TABLET | Refills: 3 | Status: SHIPPED | OUTPATIENT
Start: 2021-09-27 | End: 2022-01-01 | Stop reason: SDUPTHER

## 2021-09-27 RX ORDER — AMLODIPINE BESYLATE 5 MG/1
5 TABLET ORAL 2 TIMES DAILY
COMMUNITY
Start: 2021-08-10 | End: 2021-09-27 | Stop reason: SDUPTHER

## 2021-09-27 RX ORDER — QUETIAPINE FUMARATE 50 MG/1
50 TABLET, FILM COATED ORAL
Qty: 90 TABLET | Refills: 3 | Status: SHIPPED | OUTPATIENT
Start: 2021-09-27 | End: 2022-01-01

## 2021-09-27 RX ORDER — BENAZEPRIL HYDROCHLORIDE 10 MG/1
10 TABLET ORAL 2 TIMES DAILY
Qty: 180 TABLET | Refills: 3 | Status: SHIPPED | OUTPATIENT
Start: 2021-09-27 | End: 2022-01-01 | Stop reason: SDUPTHER

## 2021-09-27 NOTE — PROGRESS NOTES
Chief Complaint  Hypertension    Subjective          Walter Chavarria presents to [facility]  --HIS BEHAVIOR ISSUES RELATED TO THE DEMENTIA HAVE IMPROVED WITH MEDS BUT ACCORDING TO HIS SISTER HE STILL GETS UP A LOT AT NIGHT, USUALLY TO EAT SOMETHING.    --LAST LIPIDS WERE OK, TOLERATING STATIN WELL  --TOLERATING BP MEDS WITHOUT APPARENT SIDE EFFECTS          No Known Allergies     Health Maintenance Due   Topic Date Due   • ANNUAL PHYSICAL  Never done   • COVID-19 Vaccine (1) Never done   • TDAP/TD VACCINES (1 - Tdap) Never done   • ZOSTER VACCINE (1 of 2) Never done   • COLORECTAL CANCER SCREENING  09/10/2014        Current Outpatient Medications on File Prior to Visit   Medication Sig   • [DISCONTINUED] amLODIPine (NORVASC) 5 MG tablet Take 5 mg by mouth 2 (two) times a day.   • [DISCONTINUED] atorvastatin (LIPITOR) 20 MG tablet TAKE ONE TABLET BY MOUTH DAILY   • [DISCONTINUED] benazepril (LOTENSIN) 10 MG tablet TAKE ONE TABLET BY MOUTH TWICE A DAY   • [DISCONTINUED] citalopram (CeleXA) 20 MG tablet TAKE ONE TABLET BY MOUTH EVERY NIGHT AT BEDTIME   • [DISCONTINUED] QUEtiapine (SEROquel) 50 MG tablet TAKE ONE TABLET BY MOUTH EVERY NIGHT AT BEDTIME   • [DISCONTINUED] benazepril (LOTENSIN) 10 MG tablet Take 10 mg by mouth 2 (two) times a day.   • [DISCONTINUED] citalopram (CeleXA) 20 MG tablet Take 20 mg by mouth Every Night.   • [DISCONTINUED] QUEtiapine (SEROquel) 50 MG tablet Take 50 mg by mouth Every Night.     No current facility-administered medications on file prior to visit.       Immunization History   Administered Date(s) Administered   • FluLaval/Fluarix (VFC) >6 Months 09/27/2021   • Influenza, Unspecified 11/24/2020   • Pneumococcal Polysaccharide (PPSV23) 11/27/2018       Review of Systems   Constitutional: Negative for activity change, appetite change, chills, fatigue and fever.   HENT: Positive for hearing loss. Negative for ear pain, rhinorrhea and sore throat.   "  Respiratory: Negative for cough and shortness of breath.    Cardiovascular: Negative for chest pain, palpitations and leg swelling.   Gastrointestinal: Negative for abdominal pain, nausea and vomiting.   Musculoskeletal: Negative for arthralgias and myalgias.   Neurological: Negative for headache.        Objective     /68 (BP Location: Right arm, Patient Position: Sitting)   Pulse 78   Temp 99.3 °F (37.4 °C) (Oral)   Ht 168.3 cm (66.25\")   BMI 24.67 kg/m²       Physical Exam  Vitals and nursing note reviewed.   Constitutional:       General: He is not in acute distress.     Appearance: Normal appearance.   Cardiovascular:      Rate and Rhythm: Normal rate and regular rhythm.      Heart sounds: No murmur heard.     Pulmonary:      Effort: Pulmonary effort is normal.      Breath sounds: Normal breath sounds.   Abdominal:      Palpations: Abdomen is soft.      Tenderness: There is no abdominal tenderness.   Musculoskeletal:         General: No swelling.      Cervical back: Neck supple.   Lymphadenopathy:      Cervical: No cervical adenopathy.   Neurological:      General: No focal deficit present.      Mental Status: He is alert.      Cranial Nerves: No cranial nerve deficit.      Coordination: Coordination normal.      Gait: Gait normal.   Psychiatric:         Mood and Affect: Mood normal.         Behavior: Behavior normal.         Result Review :                             Assessment and Plan      Diagnoses and all orders for this visit:    1. High cholesterol (Primary)  Assessment & Plan:  Lipid abnormalities are improving with treatment.  Nutritional counseling was provided.  Lipids will be reassessed in 6 months.      2. Essential hypertension  Assessment & Plan:  Hypertension is improving with treatment.  Continue current treatment regimen.  Dietary sodium restriction.  Weight loss.  Continue current medications.  Blood pressure will be reassessed at the next regular appointment.      3. Other " frontotemporal dementia with behavioral disturbance (HCC)  Assessment & Plan:  STABLE AT THIS TIME, IMPROVED FROM ORIGINAL SITUATION, CONTINUE SAME MEDS AND WILL REEVALUATE AT NEXT VISIT IN SIX MONTHS.        Other orders  -     FluLaval/Fluarix >6 Months ([id number])  -     QUEtiapine (SEROquel) 50 MG tablet; Take 1 tablet by mouth every night at bedtime.  Dispense: 90 tablet; Refill: 3  -     citalopram (CeleXA) 20 MG tablet; Take 1 tablet by mouth every night at bedtime.  Dispense: 90 tablet; Refill: 3  -     benazepril (LOTENSIN) 10 MG tablet; Take 1 tablet by mouth 2 (Two) Times a Day.  Dispense: 180 tablet; Refill: 3  -     atorvastatin (LIPITOR) 20 MG tablet; Take 1 tablet by mouth Daily.  Dispense: 90 tablet; Refill: 3  -     amLODIPine (NORVASC) 5 MG tablet; Take 1 tablet by mouth 2 (two) times a day.  Dispense: 180 tablet; Refill: 3          Follow Up     Return in about 6 months (around 3/27/2022).    Patient was given instructions and counseling regarding his condition or for health maintenance advice. Please see specific information pulled into the AVS if appropriate.

## 2021-09-27 NOTE — ASSESSMENT & PLAN NOTE
STABLE AT THIS TIME, IMPROVED FROM ORIGINAL SITUATION, CONTINUE SAME MEDS AND WILL REEVALUATE AT NEXT VISIT IN SIX MONTHS.

## 2022-01-01 ENCOUNTER — TELEPHONE (OUTPATIENT)
Dept: FAMILY MEDICINE CLINIC | Age: 62
End: 2022-01-01

## 2022-01-01 ENCOUNTER — REFERRAL TRIAGE (OUTPATIENT)
Dept: CASE MANAGEMENT | Facility: OTHER | Age: 62
End: 2022-01-01

## 2022-01-01 ENCOUNTER — PATIENT OUTREACH (OUTPATIENT)
Dept: CASE MANAGEMENT | Facility: OTHER | Age: 62
End: 2022-01-01

## 2022-01-01 ENCOUNTER — LAB (OUTPATIENT)
Dept: LAB | Facility: HOSPITAL | Age: 62
End: 2022-01-01

## 2022-01-01 ENCOUNTER — OFFICE VISIT (OUTPATIENT)
Dept: FAMILY MEDICINE CLINIC | Age: 62
End: 2022-01-01

## 2022-01-01 VITALS
HEIGHT: 66 IN | WEIGHT: 181.6 LBS | SYSTOLIC BLOOD PRESSURE: 128 MMHG | BODY MASS INDEX: 29.18 KG/M2 | HEART RATE: 77 BPM | DIASTOLIC BLOOD PRESSURE: 81 MMHG

## 2022-01-01 DIAGNOSIS — E78.00 HIGH CHOLESTEROL: ICD-10-CM

## 2022-01-01 DIAGNOSIS — F02.818 OTHER FRONTOTEMPORAL DEMENTIA WITH BEHAVIORAL DISTURBANCE: ICD-10-CM

## 2022-01-01 DIAGNOSIS — I10 ESSENTIAL HYPERTENSION: Primary | ICD-10-CM

## 2022-01-01 DIAGNOSIS — Z12.5 SCREENING FOR PROSTATE CANCER: ICD-10-CM

## 2022-01-01 DIAGNOSIS — G31.09 OTHER FRONTOTEMPORAL DEMENTIA WITH BEHAVIORAL DISTURBANCE: ICD-10-CM

## 2022-01-01 DIAGNOSIS — Z76.89 ENCOUNTER FOR SOCIAL WORK INTERVENTION: ICD-10-CM

## 2022-01-01 DIAGNOSIS — H91.3 DEAF MUTISM, ACQUIRED: Primary | ICD-10-CM

## 2022-01-01 LAB
ALBUMIN SERPL-MCNC: 4.2 G/DL (ref 3.5–5.2)
ALBUMIN/GLOB SERPL: 1.8 G/DL
ALP SERPL-CCNC: 93 U/L (ref 39–117)
ALT SERPL W P-5'-P-CCNC: 8 U/L (ref 1–41)
ANION GAP SERPL CALCULATED.3IONS-SCNC: 10.9 MMOL/L (ref 5–15)
AST SERPL-CCNC: 10 U/L (ref 1–40)
BILIRUB SERPL-MCNC: 0.2 MG/DL (ref 0–1.2)
BUN SERPL-MCNC: 18 MG/DL (ref 6–20)
BUN/CREAT SERPL: 16.1 (ref 7–25)
CALCIUM SPEC-SCNC: 8.6 MG/DL (ref 8.6–10.5)
CHLORIDE SERPL-SCNC: 106 MMOL/L (ref 98–107)
CHOLEST SERPL-MCNC: 154 MG/DL (ref 0–200)
CO2 SERPL-SCNC: 22.1 MMOL/L (ref 22–29)
CREAT SERPL-MCNC: 1.12 MG/DL (ref 0.76–1.27)
DEPRECATED RDW RBC AUTO: 38.1 FL (ref 37–54)
EGFRCR SERPLBLD CKD-EPI 2021: 75.7 ML/MIN/1.73
ERYTHROCYTE [DISTWIDTH] IN BLOOD BY AUTOMATED COUNT: 13.3 % (ref 12.3–15.4)
GLOBULIN UR ELPH-MCNC: 2.3 GM/DL
GLUCOSE SERPL-MCNC: 105 MG/DL (ref 65–99)
HCT VFR BLD AUTO: 39.7 % (ref 37.5–51)
HDLC SERPL-MCNC: 31 MG/DL (ref 40–60)
HGB BLD-MCNC: 12.3 G/DL (ref 13–17.7)
LDLC SERPL CALC-MCNC: 94 MG/DL (ref 0–100)
LDLC/HDLC SERPL: 2.92 {RATIO}
MCH RBC QN AUTO: 24.3 PG (ref 26.6–33)
MCHC RBC AUTO-ENTMCNC: 31 G/DL (ref 31.5–35.7)
MCV RBC AUTO: 78.5 FL (ref 79–97)
PLATELET # BLD AUTO: 259 10*3/MM3 (ref 140–450)
PMV BLD AUTO: 11.1 FL (ref 6–12)
POTASSIUM SERPL-SCNC: 4.3 MMOL/L (ref 3.5–5.2)
PROT SERPL-MCNC: 6.5 G/DL (ref 6–8.5)
PSA SERPL-MCNC: 1.41 NG/ML (ref 0–4)
RBC # BLD AUTO: 5.06 10*6/MM3 (ref 4.14–5.8)
SODIUM SERPL-SCNC: 139 MMOL/L (ref 136–145)
TRIGL SERPL-MCNC: 163 MG/DL (ref 0–150)
TSH SERPL DL<=0.05 MIU/L-ACNC: 1.84 UIU/ML (ref 0.27–4.2)
VLDLC SERPL-MCNC: 29 MG/DL (ref 5–40)
WBC NRBC COR # BLD: 7.56 10*3/MM3 (ref 3.4–10.8)

## 2022-01-01 PROCEDURE — 99214 OFFICE O/P EST MOD 30 MIN: CPT | Performed by: FAMILY MEDICINE

## 2022-01-01 PROCEDURE — 85027 COMPLETE CBC AUTOMATED: CPT | Performed by: FAMILY MEDICINE

## 2022-01-01 PROCEDURE — G0103 PSA SCREENING: HCPCS | Performed by: FAMILY MEDICINE

## 2022-01-01 PROCEDURE — 36415 COLL VENOUS BLD VENIPUNCTURE: CPT | Performed by: FAMILY MEDICINE

## 2022-01-01 PROCEDURE — 84443 ASSAY THYROID STIM HORMONE: CPT | Performed by: FAMILY MEDICINE

## 2022-01-01 PROCEDURE — 80053 COMPREHEN METABOLIC PANEL: CPT | Performed by: FAMILY MEDICINE

## 2022-01-01 PROCEDURE — 80061 LIPID PANEL: CPT | Performed by: FAMILY MEDICINE

## 2022-01-01 RX ORDER — QUETIAPINE FUMARATE 100 MG/1
100 TABLET, FILM COATED ORAL
Qty: 90 TABLET | Refills: 1 | Status: SHIPPED | OUTPATIENT
Start: 2022-01-01

## 2022-01-01 RX ORDER — CITALOPRAM 20 MG/1
20 TABLET ORAL
Qty: 90 TABLET | Refills: 3 | Status: SHIPPED | OUTPATIENT
Start: 2022-01-01

## 2022-01-01 RX ORDER — BENAZEPRIL HYDROCHLORIDE 10 MG/1
10 TABLET ORAL 2 TIMES DAILY
Qty: 180 TABLET | Refills: 3 | Status: SHIPPED | OUTPATIENT
Start: 2022-01-01

## 2022-01-01 RX ORDER — AMLODIPINE BESYLATE 5 MG/1
5 TABLET ORAL 2 TIMES DAILY
Qty: 180 TABLET | Refills: 1 | Status: SHIPPED | OUTPATIENT
Start: 2022-01-01

## 2022-01-01 RX ORDER — ATORVASTATIN CALCIUM 20 MG/1
20 TABLET, FILM COATED ORAL DAILY
Qty: 90 TABLET | Refills: 3 | Status: SHIPPED | OUTPATIENT
Start: 2022-01-01

## 2022-06-23 NOTE — ASSESSMENT & PLAN NOTE
Hypertension is improving with treatment.  Continue current treatment regimen.  Continue current medications.  Blood pressure will be reassessed at the next regular appointment.  TODAY'S HISTORY WAS PROVIDED BY THE PATIENT'S SISTER WHO ACCOMPANIED HIM

## 2022-06-23 NOTE — ASSESSMENT & PLAN NOTE
Lipid abnormalities are improving with treatment.  Nutritional counseling was provided.  Lipids will be reassessed in 6 months.  REVIEWED RESULTS OF CBC, CMP, LIPID PANEL, TSH AND PSA

## 2022-06-23 NOTE — PROGRESS NOTES
Chief Complaint  Hyperlipidemia    Subjective          Walter Chavarria presents to [facility]  --TOLERATING BLOOD PRESSURE MEDICATION WITHOUT APPARENT SIDE EFFECTS  --LAST LIPIDS WERE OK, NO ISSUES WITH THE STATIN  --BEHAVIOR IS DOING FAIRLY WELL ON CURRENT MEDS BUT STILL NOT SLEEPING WELL.  THE 50 MG OF SEROQUEL HELPS AT TIMES  --DUE FOR ROUTINE LABS         No Known Allergies     Health Maintenance Due   Topic Date Due   • COVID-19 Vaccine (1) Never done   • TDAP/TD VACCINES (1 - Tdap) Never done   • ZOSTER VACCINE (1 of 2) Never done   • COLORECTAL CANCER SCREENING  09/10/2014   • ANNUAL WELLNESS VISIT  07/31/2021   • LIPID PANEL  11/24/2021        Current Outpatient Medications on File Prior to Visit   Medication Sig   • [DISCONTINUED] amLODIPine (NORVASC) 5 MG tablet Take 1 tablet by mouth 2 (two) times a day.   • [DISCONTINUED] atorvastatin (LIPITOR) 20 MG tablet Take 1 tablet by mouth Daily.   • [DISCONTINUED] benazepril (LOTENSIN) 10 MG tablet Take 1 tablet by mouth 2 (Two) Times a Day.   • [DISCONTINUED] citalopram (CeleXA) 20 MG tablet Take 1 tablet by mouth every night at bedtime.   • [DISCONTINUED] QUEtiapine (SEROquel) 50 MG tablet Take 1 tablet by mouth every night at bedtime.     No current facility-administered medications on file prior to visit.       Immunization History   Administered Date(s) Administered   • FluLaval/Fluarix/Fluzone >6 09/27/2021   • Influenza, Unspecified 11/27/2018, 11/24/2020   • Pneumococcal Polysaccharide (PPSV23) 11/27/2018       Review of Systems   Constitutional: Negative for activity change, appetite change, chills and fever.   HENT: Negative for congestion, ear pain, rhinorrhea and sore throat.    Respiratory: Positive for cough. Negative for shortness of breath.    Cardiovascular: Negative for chest pain, palpitations and leg swelling.   Gastrointestinal: Negative for abdominal pain, constipation, diarrhea, nausea and vomiting.  "  Musculoskeletal: Negative for arthralgias and myalgias.   Neurological: Negative for headache.   --SOME LOOSE COUGH OFF AND ON     Objective     /81 (BP Location: Right arm, Patient Position: Sitting)   Pulse 77   Ht 168.3 cm (66.25\")   Wt 82.4 kg (181 lb 9.6 oz)   BMI 29.09 kg/m²       Physical Exam  Vitals and nursing note reviewed.   Constitutional:       General: He is not in acute distress.     Appearance: Normal appearance.   Cardiovascular:      Rate and Rhythm: Normal rate and regular rhythm.      Heart sounds: Normal heart sounds. No murmur heard.  Pulmonary:      Effort: Pulmonary effort is normal.      Breath sounds: Normal breath sounds.   Abdominal:      Palpations: Abdomen is soft.      Tenderness: There is no abdominal tenderness.   Musculoskeletal:      Cervical back: Neck supple.      Right lower leg: No edema.      Left lower leg: No edema.   Lymphadenopathy:      Cervical: No cervical adenopathy.   Neurological:      General: No focal deficit present.      Mental Status: He is alert.      Cranial Nerves: No cranial nerve deficit.      Coordination: Coordination normal.      Gait: Gait normal.   Psychiatric:         Mood and Affect: Mood normal.         Behavior: Behavior normal.         Result Review :                             Assessment and Plan      Diagnoses and all orders for this visit:    1. Essential hypertension (Primary)  Assessment & Plan:  Hypertension is improving with treatment.  Continue current treatment regimen.  Continue current medications.  Blood pressure will be reassessed at the next regular appointment.  TODAY'S HISTORY WAS PROVIDED BY THE PATIENT'S SISTER WHO ACCOMPANIED HIM     Orders:  -     CBC (No Diff)  -     Comprehensive Metabolic Panel  -     TSH  -     benazepril (LOTENSIN) 10 MG tablet; Take 1 tablet by mouth 2 (Two) Times a Day.  Dispense: 180 tablet; Refill: 3  -     amLODIPine (NORVASC) 5 MG tablet; Take 1 tablet by mouth 2 (Two) Times a Day.  " Dispense: 180 tablet; Refill: 1    2. Screening for prostate cancer  -     PSA Screen    3. High cholesterol  Assessment & Plan:  Lipid abnormalities are improving with treatment.  Nutritional counseling was provided.  Lipids will be reassessed in 6 months.  REVIEWED RESULTS OF CBC, CMP, LIPID PANEL, TSH AND PSA     Orders:  -     Lipid Panel  -     atorvastatin (LIPITOR) 20 MG tablet; Take 1 tablet by mouth Daily.  Dispense: 90 tablet; Refill: 3    4. Other frontotemporal dementia with behavioral disturbance (HCC)  Assessment & Plan:  IMPROVED BUT NOT AT GOAL, WILL INCREASE THE DOSE OF THE Q HS SEROQUEL     Orders:  -     QUEtiapine (SEROquel) 100 MG tablet; Take 1 tablet by mouth every night at bedtime.  Dispense: 90 tablet; Refill: 1  -     citalopram (CeleXA) 20 MG tablet; Take 1 tablet by mouth every night at bedtime.  Dispense: 90 tablet; Refill: 3          Follow Up     Return in about 6 months (around 12/23/2022) for Medicare Wellness.    Patient was given instructions and counseling regarding his condition or for health maintenance advice. Please see specific information pulled into the AVS if appropriate.

## 2022-09-26 NOTE — TELEPHONE ENCOUNTER
Pts sister Mona Prakash called and said her brother has stage  Cancer and lives with her. She said she is not able to take care of him. She is wanting him to be admitted into a nursing home. She said the  told her he doesn't qualify for the nursing home.  She ask if there is something Dr Lane can do?   She said he is refusing to eat or drink, he is incontinent of bowels and bladder.  He was held at the hospital for a 24-48 hour hold but will be discharged today.

## 2022-09-29 NOTE — OUTREACH NOTE
Care Coordination    MSW spoke with patient's sister, Mona Prakash, and patient has been admitted to [facility] for LTC placement.    [facility] -   Ambulatory Case Management    9/29/2022, 13:47 EDT
